# Patient Record
Sex: FEMALE | Race: WHITE | URBAN - METROPOLITAN AREA
[De-identification: names, ages, dates, MRNs, and addresses within clinical notes are randomized per-mention and may not be internally consistent; named-entity substitution may affect disease eponyms.]

---

## 2020-12-04 ENCOUNTER — TELEPHONE (OUTPATIENT)
Dept: VASCULAR SURGERY | Age: 65
End: 2020-12-04

## 2020-12-04 NOTE — TELEPHONE ENCOUNTER
Patient called requesting to schedule an appt. I told her I could schedule her but we would need a physician referral to see her. She stated she has gotten carotid duplex ultrasounds through [employer], which is a travel screening service. I attempted to call [facility] at [phone] but was told that the only way I could get the images and report is if we fax a release for medical records to them at [phone]. Patient stated she does not have insurance and so she would rather not redo the testing.

## 2020-12-22 NOTE — TELEPHONE ENCOUNTER
Spoke to patient and requested that she attempt to give verbal consent to [facility] so I could get the images and records. I asked her to also attempt to get it mailed to us or herself.

## 2020-12-29 NOTE — TELEPHONE ENCOUNTER
Spoke to [employer] to figure out what options the patient has. She stated that the patient can pay cash pay at a 40% discount or apply for financial assistance. I notified Dr. Ladi Velasco about her financial situation to see if he would need a redo carotid duplex if I can't get the images.      Billing advised me to give the number for Financial Assistance to call: [phone]

## 2020-12-29 NOTE — TELEPHONE ENCOUNTER
Patient is going to come by and fill out and sign the financial assistance application and the VIK for [facility] to get the images from the testing.

## 2020-12-30 NOTE — TELEPHONE ENCOUNTER
Spoke to Kelley Hart who stated she doesn't think they can do that, but will pass it up to her supervisor and see if there's a way. She recommended that the patient contact [facility] to see if they can help.

## 2020-12-30 NOTE — TELEPHONE ENCOUNTER
Patient came to the office and filled out the VIK form that I faxed to [employer]. The report she brought in is not the official report with all the data. Dr. Mercedes Mcduffie requested that I contact [facility] lab to see if there is a way to have a carotid duplex done without billing her. I called and spoke to Payton Ramsey, one of the techs, who stated she will have Kelley Hart, the lead tech call me back. He also said we can see her in office without billing her.

## 2020-12-31 NOTE — TELEPHONE ENCOUNTER
Spoke to Krishna Harris at [address] [place] [address] who stated that they do not have the ability to put images on a disc. They only can send photos on paper.

## 2020-12-31 NOTE — TELEPHONE ENCOUNTER
LM for Jatinder Gallego ([facility] Ext: [phone]) regarding the fax he sent stating they can only send images on a paper.

## 2021-01-12 NOTE — TELEPHONE ENCOUNTER
Spoke with Dr. Skyla Bartlett who said that we can bring in the patient without test results and if a [facility]  is available to come over and scan her neck at her office visit, then that would be acceptable. Attempted to call the patient but her phone was disconnected. Mailed an unable to reach letter and included the information about the testing and plan so she would know.

## 2021-02-01 ENCOUNTER — INITIAL CONSULT (OUTPATIENT)
Dept: VASCULAR SURGERY | Age: 66
End: 2021-02-01

## 2021-02-01 VITALS
SYSTOLIC BLOOD PRESSURE: 154 MMHG | TEMPERATURE: 97.1 F | RESPIRATION RATE: 18 BRPM | DIASTOLIC BLOOD PRESSURE: 71 MMHG | BODY MASS INDEX: 30.82 KG/M2 | WEIGHT: 185 LBS | OXYGEN SATURATION: 99 % | HEART RATE: 67 BPM | HEIGHT: 65 IN

## 2021-02-01 DIAGNOSIS — I65.21 CAROTID STENOSIS, RIGHT: Primary | ICD-10-CM

## 2021-02-01 DIAGNOSIS — I65.22 CAROTID OCCLUSION, LEFT: ICD-10-CM

## 2021-02-01 DIAGNOSIS — Z98.890 HISTORY OF CAROTID ENDARTERECTOMY: ICD-10-CM

## 2021-02-01 PROCEDURE — 99024 POSTOP FOLLOW-UP VISIT: CPT | Performed by: SURGERY

## 2021-02-01 NOTE — PROGRESS NOTES
Division of Vascular Surgery        New Consult      Physician Requesting Consult:  Lolis Ochoa MD    Reason for Consult:   Carotid occlusion    Chief Complaint:      Carotid occlusion    History of Present Illness:      Indio Thomas is a 63 y.o. woman who presents with asymptomatic carotid disease. She underwent right CEA in 2013 when she was found to have critical stenosis of right ICA and occlusion of left common carotid artery. She was followed for a few years and due to financial issues and insurance has not seen vascular surgeon. She denies any unilateral weakness, numbness/tingling, facial droop, thick/slurred speech or symptoms suggestive of amaurosis fugax. She is medically optimized and takes aspirin and statins daily. She has also quit smoking for several years now. She denies any pain in her lower extremities to suggest claudication or ischemic rest pain, she does not have any open wounds or sores on her feet. Medical History:     Past Medical History:   Diagnosis Date    Hyperlipidemia     Hypertension     Wears glasses        Surgical History:     Past Surgical History:   Procedure Laterality Date    TUBAL LIGATION         Family History:     Family History   Problem Relation Age of Onset    High Blood Pressure Mother     Heart Disease Mother     Heart Disease Father    Devin Hews Cancer Sister     Diabetes Brother        Allergies:       Patient has no known allergies. Medications:      Current Outpatient Medications   Medication Sig Dispense Refill    acetaminophen 650 MG TABS Take 650 mg by mouth every 4 hours as needed for Pain. 120 tablet 0    hydrochlorothiazide (HYDRODIURIL) 25 MG tablet Take 1 tablet by mouth daily. 30 tablet 0    losartan (COZAAR) 100 MG tablet Take 1 tablet by mouth daily. 30 tablet 0    metoprolol (LOPRESSOR) 50 MG tablet Take 1 tablet by mouth 2 times daily. 60 tablet 0    aspirin 325 MG EC tablet Take 1 tablet by mouth daily.  30 tablet 3    hydrochlorothiazide (MICROZIDE) 12.5 MG capsule Take 2 capsules by mouth daily. 30 capsule 3    amLODIPine (NORVASC) 5 MG tablet Take 1 tablet by mouth daily. 30 tablet 3    atorvastatin (LIPITOR) 10 MG tablet Take 10 mg by mouth daily.  therapeutic multivitamin-minerals (THERAGRAN-M) tablet Take 1 tablet by mouth daily. No current facility-administered medications for this visit. Social History:     Tobacco:    reports that she quit smoking about 6 years ago. She has a 40.00 pack-year smoking history. She has never used smokeless tobacco.  Alcohol:      reports no history of alcohol use. Drug Use:  reports no history of drug use. Review of Systems:     Review of Systems   Constitutional: Negative for chills and fever. HENT: Negative for congestion. Eyes: Negative for visual disturbance. Respiratory: Negative for chest tightness and shortness of breath. Cardiovascular: Negative for chest pain and leg swelling. Gastrointestinal: Negative for abdominal pain. Endocrine: Negative. Genitourinary: Negative. Musculoskeletal: Negative. Skin: Negative for color change and wound. Allergic/Immunologic: Negative. Neurological: Negative for facial asymmetry, speech difficulty, weakness and numbness. Hematological: Negative. Psychiatric/Behavioral: Negative. Physical Exam:     Vitals:  BP (!) 154/71 (Site: Left Upper Arm, Position: Sitting, Cuff Size: Large Adult)   Pulse 67   Temp 97.1 °F (36.2 °C) (Temporal)   Resp 18   Ht 5' 5\" (1.651 m)   Wt 185 lb (83.9 kg)   SpO2 99%   BMI 30.79 kg/m²     Physical Exam  Constitutional:       Appearance: She is well-developed and well-groomed. Eyes:      Extraocular Movements: Extraocular movements intact. Conjunctiva/sclera: Conjunctivae normal.   Neck:      Musculoskeletal: Full passive range of motion without pain. Vascular: No carotid bruit.    Cardiovascular:      Rate and Rhythm: Normal rate and regular rhythm. Pulses:           Radial pulses are 2+ on the right side and 2+ on the left side. Dorsalis pedis pulses are 2+ on the right side and 2+ on the left side. Posterior tibial pulses are 2+ on the right side and 2+ on the left side. Pulmonary:      Effort: Pulmonary effort is normal. No respiratory distress. Abdominal:      Palpations: Abdomen is soft. Tenderness: There is no abdominal tenderness. Musculoskeletal:      Right lower leg: She exhibits no tenderness and no swelling. No edema. Left lower leg: She exhibits no tenderness and no swelling. No edema. Right foot: Normal capillary refill. No tenderness or swelling. Left foot: Normal capillary refill. No tenderness or swelling. Feet:      Right foot:      Skin integrity: No ulcer or skin breakdown. Left foot:      Skin integrity: No ulcer or skin breakdown. Skin:     General: Skin is warm. Capillary Refill: Capillary refill takes less than 2 seconds. Neurological:      Mental Status: She is alert and oriented to person, place, and time. GCS: GCS eye subscore is 4. GCS verbal subscore is 5. GCS motor subscore is 6. Sensory: Sensation is intact. Motor: Motor function is intact. Psychiatric:         Mood and Affect: Mood normal.         Speech: Speech normal.         Behavior: Behavior normal.         Thought Content: Thought content normal.       Imaging/Labs:     Carotid duplex in 2013 revealed 80-99% stenosis of right ICA and chronic occlusion of left common carotid artery    Recent vascular screening reveals right ICA <50% stenosis and occlusion of left ICA  No evidence of AAA  Normal FARRUKH          Assessment and Plan:     Left carotid occlusion, right ICA <50% stenosis s/p CEA  · Continue optimal medical therapy with aspirin and statins, BP control  · Continued smoking cessation  · Yearly surveillance with carotid duplex, getting it through [facility] will be ok.   Only need to scan right side, left side is chronically occluded and does not need surveillance    Optimal medical management is an important part of overall treatment of all patients with carotid bifurcation disease, regardless of the degree of stenosis or the plan for intervention. This therapy is directed both at the reduction of stroke and overall cardiovascular events, including cardiovascular-related mortality. Clinical guidelines issued by the American Heart Association and the American Stroke Association recommends:    I. Lowering blood pressure to a target 140/90 mm Hg by lifestyle interventions and antihypertensive treatment is recommended in individuals who have hypertension with asymptomatic carotid atherosclerosis. II. Glucose control to nearly normoglycemic levels (target hemoglobin A1C 7%) is recommended among diabetic patients to reduce microvascular complications and, with lesser certainty, macrovascular complications other than stroke. III. Patients with known atherosclerosis have demonstrated reduced stroke rates when treated with lipid-lowering therapy. And continued low dose statin will help stabilize plaque and decrease the inflammatory response of atherosclerosis. IV. Smoking nearly doubles the risk of stroke and with cessation there is a reduction in the risk of stroke. V. Antiplatelet therapy in asymptomatic patients with carotid atherosclerosis is recommended to reduce overall cardiovascular morbidity although it has not been shown to be effective in the primary prevention of stroke. Electronically signed by Katie Pineda MD on 2/1/21 at 3:23 PM [address]  Office: [phone]  Cell: [phone]  Email: [email or web address]

## 2021-02-08 ASSESSMENT — ENCOUNTER SYMPTOMS
SHORTNESS OF BREATH: 0
ABDOMINAL PAIN: 0
ALLERGIC/IMMUNOLOGIC NEGATIVE: 1
CHEST TIGHTNESS: 0
COLOR CHANGE: 0

## 2023-04-18 ENCOUNTER — HOSPITAL ENCOUNTER (OUTPATIENT)
Dept: MAMMOGRAPHY | Age: 68
Discharge: HOME | End: 2023-04-20
Payer: MEDICARE

## 2023-04-18 VITALS — HEIGHT: 65 IN | WEIGHT: 175 LBS | BODY MASS INDEX: 29.16 KG/M2

## 2023-04-18 DIAGNOSIS — Z12.31 BREAST CANCER SCREENING BY MAMMOGRAM: ICD-10-CM

## 2023-04-18 PROCEDURE — 77063 BREAST TOMOSYNTHESIS BI: CPT

## 2023-09-25 ENCOUNTER — HOSPITAL ENCOUNTER (OUTPATIENT)
Dept: MAMMOGRAPHY | Age: 68
Discharge: HOME | End: 2023-09-27
Payer: COMMERCIAL

## 2023-09-25 ENCOUNTER — HOSPITAL ENCOUNTER (OUTPATIENT)
Dept: ULTRASOUND IMAGING | Age: 68
Discharge: HOME | End: 2023-09-27
Payer: COMMERCIAL

## 2023-09-25 DIAGNOSIS — R92.8 ABNORMAL MAMMOGRAM: ICD-10-CM

## 2023-09-25 PROCEDURE — 76642 ULTRASOUND BREAST LIMITED: CPT

## 2023-09-25 PROCEDURE — G0279 TOMOSYNTHESIS, MAMMO: HCPCS
